# Patient Record
Sex: FEMALE | Race: WHITE | NOT HISPANIC OR LATINO | URBAN - METROPOLITAN AREA
[De-identification: names, ages, dates, MRNs, and addresses within clinical notes are randomized per-mention and may not be internally consistent; named-entity substitution may affect disease eponyms.]

---

## 2018-02-27 ENCOUNTER — EMERGENCY (EMERGENCY)
Facility: HOSPITAL | Age: 83
LOS: 1 days | Discharge: HOME | End: 2018-02-27
Attending: EMERGENCY MEDICINE
Payer: MEDICARE

## 2018-02-27 VITALS
HEART RATE: 86 BPM | OXYGEN SATURATION: 97 % | TEMPERATURE: 98 F | DIASTOLIC BLOOD PRESSURE: 72 MMHG | RESPIRATION RATE: 18 BRPM | SYSTOLIC BLOOD PRESSURE: 136 MMHG

## 2018-02-27 VITALS
DIASTOLIC BLOOD PRESSURE: 82 MMHG | WEIGHT: 104.94 LBS | SYSTOLIC BLOOD PRESSURE: 145 MMHG | TEMPERATURE: 98 F | HEIGHT: 62 IN | OXYGEN SATURATION: 96 % | HEART RATE: 90 BPM | RESPIRATION RATE: 16 BRPM

## 2018-02-27 DIAGNOSIS — M25.512 PAIN IN LEFT SHOULDER: ICD-10-CM

## 2018-02-27 DIAGNOSIS — Z98.89 OTHER SPECIFIED POSTPROCEDURAL STATES: Chronic | ICD-10-CM

## 2018-02-27 DIAGNOSIS — J44.9 CHRONIC OBSTRUCTIVE PULMONARY DISEASE, UNSPECIFIED: ICD-10-CM

## 2018-02-27 DIAGNOSIS — S09.90XA UNSPECIFIED INJURY OF HEAD, INITIAL ENCOUNTER: ICD-10-CM
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
## 2018-02-27 DIAGNOSIS — Z98.890 OTHER SPECIFIED POSTPROCEDURAL STATES: ICD-10-CM

## 2018-02-27 DIAGNOSIS — Z79.01 LONG TERM (CURRENT) USE OF ANTICOAGULANTS: ICD-10-CM

## 2018-02-27 DIAGNOSIS — Z79.51 LONG TERM (CURRENT) USE OF INHALED STEROIDS: ICD-10-CM

## 2018-02-27 DIAGNOSIS — Z91.040 LATEX ALLERGY STATUS: ICD-10-CM
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
## 2018-02-27 DIAGNOSIS — R07.81 PLEURODYNIA: ICD-10-CM
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
## 2018-02-27 DIAGNOSIS — S72.001G FRACTURE OF UNSPECIFIED PART OF NECK OF RIGHT FEMUR, SUBSEQUENT ENCOUNTER FOR CLOSED FRACTURE WITH DELAYED HEALING: Chronic | ICD-10-CM

## 2018-02-27 LAB
ANION GAP SERPL CALC-SCNC: 15 MMOL/L — SIGNIFICANT CHANGE UP (ref 5–17)
APTT BLD: 61.2 SEC — HIGH (ref 27.5–37.4)
BASOPHILS # BLD AUTO: 0 K/UL — SIGNIFICANT CHANGE UP (ref 0–0.2)
BASOPHILS NFR BLD AUTO: 0.2 % — SIGNIFICANT CHANGE UP (ref 0–2)
BLD GP AB SCN SERPL QL: SIGNIFICANT CHANGE UP
BUN SERPL-MCNC: 34 MG/DL — HIGH (ref 8–20)
CALCIUM SERPL-MCNC: 8.9 MG/DL — SIGNIFICANT CHANGE UP (ref 8.6–10.2)
CHLORIDE SERPL-SCNC: 96 MMOL/L — LOW (ref 98–107)
CO2 SERPL-SCNC: 27 MMOL/L — SIGNIFICANT CHANGE UP (ref 22–29)
CREAT SERPL-MCNC: 1.03 MG/DL — SIGNIFICANT CHANGE UP (ref 0.5–1.3)
EOSINOPHIL # BLD AUTO: 0.3 K/UL — SIGNIFICANT CHANGE UP (ref 0–0.5)
EOSINOPHIL NFR BLD AUTO: 2.8 % — SIGNIFICANT CHANGE UP (ref 0–6)
GLUCOSE SERPL-MCNC: 111 MG/DL — SIGNIFICANT CHANGE UP (ref 70–115)
HCT VFR BLD CALC: 43.1 % — SIGNIFICANT CHANGE UP (ref 37–47)
HGB BLD-MCNC: 13.6 G/DL — SIGNIFICANT CHANGE UP (ref 12–16)
INR BLD: 1.4 RATIO — HIGH (ref 0.88–1.16)
LYMPHOCYTES # BLD AUTO: 16.1 % — LOW (ref 20–55)
LYMPHOCYTES # BLD AUTO: 2 K/UL — SIGNIFICANT CHANGE UP (ref 1–4.8)
MAGNESIUM SERPL-MCNC: 2.1 MG/DL — SIGNIFICANT CHANGE UP (ref 1.6–2.6)
MCHC RBC-ENTMCNC: 29.6 PG — SIGNIFICANT CHANGE UP (ref 27–31)
MCHC RBC-ENTMCNC: 31.6 G/DL — LOW (ref 32–36)
MCV RBC AUTO: 93.9 FL — SIGNIFICANT CHANGE UP (ref 81–99)
MONOCYTES # BLD AUTO: 0.8 K/UL — SIGNIFICANT CHANGE UP (ref 0–0.8)
MONOCYTES NFR BLD AUTO: 6.6 % — SIGNIFICANT CHANGE UP (ref 3–10)
NEUTROPHILS # BLD AUTO: 9 K/UL — HIGH (ref 1.8–8)
NEUTROPHILS NFR BLD AUTO: 73.7 % — HIGH (ref 37–73)
PLATELET # BLD AUTO: 337 K/UL — SIGNIFICANT CHANGE UP (ref 150–400)
POTASSIUM SERPL-MCNC: 4.2 MMOL/L — SIGNIFICANT CHANGE UP (ref 3.5–5.3)
POTASSIUM SERPL-SCNC: 4.2 MMOL/L — SIGNIFICANT CHANGE UP (ref 3.5–5.3)
PROTHROM AB SERPL-ACNC: 15.5 SEC — HIGH (ref 9.8–12.7)
RBC # BLD: 4.59 M/UL — SIGNIFICANT CHANGE UP (ref 4.4–5.2)
RBC # FLD: 15.5 % — SIGNIFICANT CHANGE UP (ref 11–15.6)
SODIUM SERPL-SCNC: 138 MMOL/L — SIGNIFICANT CHANGE UP (ref 135–145)
TYPE + AB SCN PNL BLD: SIGNIFICANT CHANGE UP
WBC # BLD: 12.2 K/UL — HIGH (ref 4.8–10.8)
WBC # FLD AUTO: 12.2 K/UL — HIGH (ref 4.8–10.8)

## 2018-02-27 PROCEDURE — 86901 BLOOD TYPING SEROLOGIC RH(D): CPT

## 2018-02-27 PROCEDURE — 74177 CT ABD & PELVIS W/CONTRAST: CPT | Mod: 26

## 2018-02-27 PROCEDURE — 83735 ASSAY OF MAGNESIUM: CPT

## 2018-02-27 PROCEDURE — 83605 ASSAY OF LACTIC ACID: CPT

## 2018-02-27 PROCEDURE — 72170 X-RAY EXAM OF PELVIS: CPT | Mod: 26

## 2018-02-27 PROCEDURE — 71045 X-RAY EXAM CHEST 1 VIEW: CPT | Mod: 26

## 2018-02-27 PROCEDURE — 85610 PROTHROMBIN TIME: CPT

## 2018-02-27 PROCEDURE — 86850 RBC ANTIBODY SCREEN: CPT

## 2018-02-27 PROCEDURE — 36415 COLL VENOUS BLD VENIPUNCTURE: CPT

## 2018-02-27 PROCEDURE — 80048 BASIC METABOLIC PNL TOTAL CA: CPT

## 2018-02-27 PROCEDURE — 70450 CT HEAD/BRAIN W/O DYE: CPT | Mod: 26

## 2018-02-27 PROCEDURE — 72170 X-RAY EXAM OF PELVIS: CPT

## 2018-02-27 PROCEDURE — 99284 EMERGENCY DEPT VISIT MOD MDM: CPT | Mod: 25

## 2018-02-27 PROCEDURE — 71260 CT THORAX DX C+: CPT | Mod: 26

## 2018-02-27 PROCEDURE — 99282 EMERGENCY DEPT VISIT SF MDM: CPT

## 2018-02-27 PROCEDURE — 85730 THROMBOPLASTIN TIME PARTIAL: CPT

## 2018-02-27 PROCEDURE — 71260 CT THORAX DX C+: CPT

## 2018-02-27 PROCEDURE — 99291 CRITICAL CARE FIRST HOUR: CPT

## 2018-02-27 PROCEDURE — 86900 BLOOD TYPING SEROLOGIC ABO: CPT

## 2018-02-27 PROCEDURE — 72125 CT NECK SPINE W/O DYE: CPT | Mod: 26

## 2018-02-27 PROCEDURE — 85027 COMPLETE CBC AUTOMATED: CPT

## 2018-02-27 PROCEDURE — 71045 X-RAY EXAM CHEST 1 VIEW: CPT

## 2018-02-27 PROCEDURE — 74177 CT ABD & PELVIS W/CONTRAST: CPT

## 2018-02-27 PROCEDURE — 70450 CT HEAD/BRAIN W/O DYE: CPT

## 2018-02-27 PROCEDURE — 72125 CT NECK SPINE W/O DYE: CPT

## 2018-02-27 NOTE — ED PROVIDER NOTE - SKIN WOUND DESCRIPTION
Hematoma to forehead with superficial skin abrasion over it. No other scalpel laceration. No laceration or abrasion to back.

## 2018-02-27 NOTE — H&P ADULT - NSHPPHYSICALEXAM_GEN_ALL_CORE
HEENT: +hematoma with overlying superficial brasion of the forehead, no facial tenderness, no scalp tenderness (with the exception of the hematoma), Pupils 1mm b/l, EOMI  Neck: Trachea midline, no swelling, no JVD  Chest: CTAB, equal air entry, no chest tenderness  Back: Without midline tenderness  Abdomen: Soft, NTTP, no ecchymosis, pelvis stable  Vascular: + femoral, radial, and DP pulses b/l  Neuro: Oriented to self and place but not year, no focal deficit, motor and sensory function intact throughout  Extremities: 3+LLE edema, 1+ edema of RLE, no noted areas of deformity or point tenderness, FROM without pain  Skin: Scattered ecchymosis of the b/l LE with no identified acute wounds

## 2018-02-27 NOTE — ED PROVIDER NOTE - PMH
COPD (chronic obstructive pulmonary disease)    DVT (deep venous thrombosis)    Other specified hypothyroidism    Pulmonary embolism

## 2018-02-27 NOTE — H&P ADULT - ASSESSMENT
89F with superficial hematoma of the forehead with no underlying traumatic injuries. CT head, cervical spine, chest, abdomen, and pelvis all negative for acute traumatic injuries. Incidental finding of hiatal hernia and right breast mass noted.

## 2018-02-27 NOTE — ED PROVIDER NOTE - PSH
Closed hip fracture requiring operative repair, right, with delayed healing, subsequent encounter  in feb , 2015.  S/P IVC filter

## 2018-02-27 NOTE — ED PROVIDER NOTE - OBJECTIVE STATEMENT
89 year old female presenting to the ED BIBA for left shoulder pain, right rib pain, and pain to her head s/p fall today. As per EMS, pt is a resident at [facility] and had struck her head while falling today. EMS states that the pt was found on the floor in a fetal position. EMS states that the pt is on Pradaxa and has had slight amnesia after the event. Code trauma B called at 1849.

## 2018-02-27 NOTE — ED PROVIDER NOTE - MEDICAL DECISION MAKING DETAILS
Pt s/p ground level fall with obvious external signs of trauma. Code trauma B called, Will followup on studies.

## 2018-02-27 NOTE — H&P ADULT - PROBLEM SELECTOR PLAN 1
No traumatic injuries warranting admission to the ACS service  Patient may return to original facility  Plan discussed with ED attending who will draw attention to hiatal hernia and right breast mass on discharge paperwork so that they can be addressed as an outpatient.  Plan discussed with Dr. House

## 2018-02-27 NOTE — ED PROVIDER NOTE - CRITICAL CARE PROVIDED
additional history taking/consult w/ pt's family directly relating to pts condition/direct patient care (not related to procedure)/documentation

## 2018-02-27 NOTE — H&P ADULT - HISTORY OF PRESENT ILLNESS
Pt is a 89F who was found down at the long term care facility where she resides. Pt was found to have a hematoma and abrasions of the forehead so she was sent to [facility]. EMS reports that she is oriented to self and place in transport but not year with no significant changes in vital signs. Upon arrival she was adamantly refusing a cervical collar so one was not placed in order to continue with the exam. Primary survey was intact with the exception of mild confusion which seems to be her baseline. She offers no acute complaints. Denies chest pain, pain in extremities, chest pain, SOB, palpitations.

## 2018-02-27 NOTE — H&P ADULT - NSHPLABSRESULTS_GEN_ALL_CORE
02-27    138  |  96<L>  |  34.0<H>  ----------------------------<  111  4.2   |  27.0  |  1.03    Ca    8.9      27 Feb 2018 19:06  Mg     2.1     02-27                          13.6   12.2  )-----------( 337      ( 27 Feb 2018 19:06 )             43.1

## 2018-02-27 NOTE — H&P ADULT - ATTENDING COMMENTS
Patient seen and examined at time of trauma B activation.  Agree with above H+P.  89 yo female on Pradaxa for lower extremity DVT found down at LTC.  Unwitnessed.  Unknown down time.  GCS 14 (confused).  Airway intact.  Primary survey intact.  Secondary survey shows a left forehead cephalohematoma and abrasion.  No other injuries noted.  Metabolically and hemodynamically ok.  CXR and PXR negative.  CT head, C-spine, chest/A/P negative for traumatic injuries.  Incidental findings of hiatal hernia w/ stomach in chest and a right breast mass - ED attending to address incidental findings on discharge forms so can be addressed as outpatient.  No traumatic injuries warranting admission to trauma service.  May return to facility.

## 2025-03-11 NOTE — ED ADULT TRIAGE NOTE - BMI (KG/M2)
Refill Routing Note   Medication(s) are not appropriate for processing by [facility] for the following reason(s):      Refused 1 week ago (3/3/2025): Patient needs an appointment - next O/V 3/24    [facility] action(s):  Defer Care Due:  None identified     Medication Therapy Plan: Refused 1 week ago (3/3/2025): Patient needs an appointment  - next O/V 3/24      Appointments  past 12m or future 3m with PCP    Date Provider   Last Visit   2/27/2024 Hector Gonzales MD   Next Visit   3/24/2025 Hector Gonzales MD   ED visits in past 90 days: 0        Note composed:9:56 PM 03/10/2025           19.2